# Patient Record
Sex: FEMALE | Race: WHITE | Employment: FULL TIME | URBAN - METROPOLITAN AREA
[De-identification: names, ages, dates, MRNs, and addresses within clinical notes are randomized per-mention and may not be internally consistent; named-entity substitution may affect disease eponyms.]

---

## 2017-12-18 ENCOUNTER — OFFICE VISIT (OUTPATIENT)
Dept: SURGERY | Age: 57
End: 2017-12-18

## 2017-12-18 VITALS
HEIGHT: 65 IN | SYSTOLIC BLOOD PRESSURE: 140 MMHG | HEART RATE: 74 BPM | WEIGHT: 164 LBS | DIASTOLIC BLOOD PRESSURE: 80 MMHG | TEMPERATURE: 98.5 F | BODY MASS INDEX: 27.32 KG/M2

## 2017-12-18 DIAGNOSIS — K63.5 DYSPLASTIC POLYP OF COLON: Primary | ICD-10-CM

## 2017-12-18 RX ORDER — SIMVASTATIN 20 MG/1
TABLET, FILM COATED ORAL
COMMUNITY

## 2017-12-18 RX ORDER — HYDROCHLOROTHIAZIDE 12.5 MG/1
12.5 CAPSULE ORAL DAILY
COMMUNITY

## 2017-12-18 RX ORDER — GLUCOSAMINE SULFATE 1500 MG
POWDER IN PACKET (EA) ORAL DAILY
COMMUNITY

## 2017-12-18 RX ORDER — LANOLIN ALCOHOL/MO/W.PET/CERES
1 CREAM (GRAM) TOPICAL
COMMUNITY

## 2017-12-18 NOTE — PROGRESS NOTES
HPI: Nisha Tabares is a 62 y.o. female presenting with chief complain of recent colonoscopy with a dysplastic polyp identified. The patient was asymptomatic. After some time being asked if she would like a colonoscopy she finally agreed to a color guard. This was positive. She underwent colonoscopy on  where a large polyp in the descending colon was identified this was removed in piecemeal fashion. The area beside it was anchored. Pathology report shows tubular adenoma with high-grade dysplasia. The report notes the excision was complete. She is here to discuss these results and the possible need for surgery. She otherwise has no abdominal complaints. She moves her bowels once daily. She denies rectal bleeding. She denies fecal incontinence. Past Medical History:   Diagnosis Date    High cholesterol     Hypertension        Past Surgical History:   Procedure Laterality Date    HX  SECTION      HX COLONOSCOPY  2017    Dr. Abram Chambers HX HYSTERECTOMY         Family History   Problem Relation Age of Onset    Colon Polyps Brother        Social History     Social History    Marital status:      Spouse name: N/A    Number of children: N/A    Years of education: N/A     Social History Main Topics    Smoking status: Never Smoker    Smokeless tobacco: Never Used    Alcohol use Yes      Comment: occasional    Drug use: None    Sexual activity: Not Asked     Other Topics Concern    None     Social History Narrative       Review of Systems - Review of Systems   Constitutional: Negative. HENT: Negative. Eyes: Negative. Respiratory: Negative. Cardiovascular: Negative. Gastrointestinal: Negative. Genitourinary: Negative. Musculoskeletal: Negative. Skin: Negative. Neurological: Negative. Endo/Heme/Allergies: Negative. Psychiatric/Behavioral: Negative.         Outpatient Prescriptions Marked as Taking for the 17 encounter (Office Visit) with Sandhya Bhatt MD   Medication Sig Dispense Refill    calcium citrate-vitamin d3 (CITRACAL+D) 315-200 mg-unit tab Take 1 Tab by mouth daily (with breakfast).  cholecalciferol (VITAMIN D3) 1,000 unit cap Take  by mouth daily.  hydroCHLOROthiazide (MICROZIDE) 12.5 mg capsule Take 12.5 mg by mouth daily.  simvastatin (ZOCOR) 20 mg tablet Take  by mouth nightly.  atenolol (TENORMIN) 25 mg tablet Take 25 mg by mouth daily. No Known Allergies    Vitals:    12/18/17 1336   BP: 140/80   Pulse: 74   Temp: 98.5 °F (36.9 °C)   Weight: 74.4 kg (164 lb)   Height: 5' 5\" (1.651 m)   PainSc:   0 - No pain       Physical Exam   Constitutional: She appears well-developed and well-nourished. HENT:   Head: Normocephalic and atraumatic. Eyes: Conjunctivae and EOM are normal.   Abdominal: Soft. She exhibits no distension. There is no tenderness. Musculoskeletal: Normal range of motion. Lymphadenopathy:     She has no cervical adenopathy. Right: No inguinal adenopathy present. Left: No inguinal adenopathy present. Neurological: She exhibits normal muscle tone. Skin: Skin is warm and dry. No rash noted. Psychiatric: She has a normal mood and affect. Her speech is normal.   ELIZABETH good tone, no mass    Colonoscopy report as above with large dysplastic lesion of descending colon    Assessment / Plan    Large dysplastic lesion of the ascending colon, completely removed by gastroenterologist  Because there is no evidence of cancerous changes would not recommend colectomy  Patient should have follow-up colonoscopy with Dr. Aleksandra Gonzalez, the interval to be determined by him  Follow-up in the office as needed    A total of 30 minutes was spent with the patient, with > 50% of time spent in counseling and coordination of care. The diagnoses and plan were discussed with the patient. All questions answered. Plan of care agreed to by all concerned.

## 2017-12-18 NOTE — LETTER
12/18/2017 2:17 PM 
 
Patient:  Ade Crocker YOB: 1960 Date of Visit: 12/18/2017 Ekaterina Rogers MD 
[address] [place] [address] [address] [facility] [address] [address] VIA Facsimile: [phone] Carley Barahona, [address] [address] VIA Facsimile: [phone] Dear Megan April was seen in the office today regarding the polyp you identified in the descending colon on recent screening colonoscopy. This was her first colonoscopy, and was prompted by the result of a positive Cologuard study given to her by Dr. Chino Prado. Her exam in the office today is benign. The pathology was consistent with dysplasia. Given this I do not recommend surgical resection at this time. I will of course leave it to you to decide on the appropriate interval for follow-up colonoscopy. I would think that this would depend largely on the confidence level of complete removal of the lesion. Thank you very much for your referral of Ms. Ade Crocker. If you have questions, please do not hesitate to call me. I look forward to following Ms. Margarette Ureña along with you and will keep you updated as to her progress. Sincerely, Mirtha Bae MD

## 2019-07-25 ENCOUNTER — HOSPITAL ENCOUNTER (OUTPATIENT)
Dept: MAMMOGRAPHY | Age: 59
Discharge: HOME | End: 2019-07-25
Attending: PHYSICIAN ASSISTANT
Payer: COMMERCIAL

## 2019-07-25 DIAGNOSIS — Z12.39 SCREENING FOR BREAST CANCER: ICD-10-CM

## 2019-07-25 PROCEDURE — 77063 BREAST TOMOSYNTHESIS BI: CPT

## 2022-12-20 ENCOUNTER — TRANSCRIBE ORDER (OUTPATIENT)
Dept: SCHEDULING | Age: 62
End: 2022-12-20

## 2022-12-20 DIAGNOSIS — Z12.31 ENCOUNTER FOR SCREENING MAMMOGRAM FOR MALIGNANT NEOPLASM OF BREAST: Primary | ICD-10-CM

## 2023-01-31 DIAGNOSIS — Z12.31 ENCOUNTER FOR SCREENING MAMMOGRAM FOR MALIGNANT NEOPLASM OF BREAST: Primary | ICD-10-CM

## 2023-02-04 DIAGNOSIS — Z12.31 ENCOUNTER FOR SCREENING MAMMOGRAM FOR MALIGNANT NEOPLASM OF BREAST: Primary | ICD-10-CM

## 2023-06-06 ENCOUNTER — TRANSCRIBE ORDERS (OUTPATIENT)
Dept: SCHEDULING | Age: 63
End: 2023-06-06

## 2023-06-06 DIAGNOSIS — Z12.31 VISIT FOR SCREENING MAMMOGRAM: Primary | ICD-10-CM

## 2023-07-03 ENCOUNTER — HOSPITAL ENCOUNTER (OUTPATIENT)
Facility: HOSPITAL | Age: 63
Discharge: HOME | End: 2023-07-06
Attending: FAMILY MEDICINE
Payer: COMMERCIAL

## 2023-07-03 DIAGNOSIS — Z12.31 VISIT FOR SCREENING MAMMOGRAM: ICD-10-CM

## 2023-07-03 PROCEDURE — 77063 BREAST TOMOSYNTHESIS BI: CPT

## 2025-08-20 ENCOUNTER — TRANSCRIBE ORDERS (OUTPATIENT)
Facility: HOSPITAL | Age: 65
End: 2025-08-20

## 2025-08-20 DIAGNOSIS — Z12.31 ENCOUNTER FOR SCREENING MAMMOGRAM FOR MALIGNANT NEOPLASM OF BREAST: Primary | ICD-10-CM
